# Patient Record
Sex: FEMALE | URBAN - METROPOLITAN AREA
[De-identification: names, ages, dates, MRNs, and addresses within clinical notes are randomized per-mention and may not be internally consistent; named-entity substitution may affect disease eponyms.]

---

## 2019-07-06 ENCOUNTER — HOSPITAL ENCOUNTER (OUTPATIENT)
Dept: TELEMEDICINE | Facility: HOSPITAL | Age: 64
Discharge: HOME | End: 2019-07-06
Payer: MEDICARE

## 2019-07-06 DIAGNOSIS — G45.9 TIA DUE TO EMBOLISM: ICD-10-CM

## 2019-07-06 DIAGNOSIS — I74.9 TIA DUE TO EMBOLISM: ICD-10-CM

## 2019-07-06 PROCEDURE — G0425 PR INPT TELEHEALTH CONSULT 30M: ICD-10-PCS | Mod: GT,,, | Performed by: PSYCHIATRY & NEUROLOGY

## 2019-07-06 PROCEDURE — G0425 INPT/ED TELECONSULT30: HCPCS | Mod: GT,,, | Performed by: PSYCHIATRY & NEUROLOGY

## 2019-07-07 NOTE — CONSULTS
[facility]  Vascular Neurology  Comprehensive Stroke Center  Tele-Consultation Note      Consults    Consulting Provider: COOPER KAUR  Current Providers  No providers found    Patient Location: [facility] Department  Spoke hospital nurse at bedside with patient assisting consultant.     Patient information was obtained from patient and relative(s).         Assessment/Plan:     STROKE DOCUMENTATION     Acute Stroke Times:   Acute Stroke Times   Symptom Onset Date: 07/06/19  Symptom Onset Time: 1600  Stroke Team Arrival Date: 07/06/19  Stroke Team Arrival Time: 1938  CT Interpretation Time: 1943(report. not sent via PACS)    NIH Scale:  1a. Level of Consciousness: 0-->Alert, keenly responsive  1b. LOC Questions: 0-->Answers both questions correctly  1c. LOC Commands: 0-->Performs both tasks correctly  2. Best Gaze: 0-->Normal  3. Visual: 0-->No visual loss  4. Facial Palsy: 0-->Normal symmetrical movements  5a. Motor Arm, Left: 0-->No drift, limb holds 90 (or 45) degrees for full 10 secs  5b. Motor Arm, Right: 0-->No drift, limb holds 90 (or 45) degrees for full 10 secs  6a. Motor Leg, Left: 0-->No drift, leg holds 30 degree position for full 5 secs  6b. Motor Leg, Right: 0-->No drift, leg holds 30 degree position for full 5 secs  7. Limb Ataxia: 0-->Absent  8. Sensory: 0-->Normal, no sensory loss  9. Best Language: 0-->No aphasia, normal  10. Dysarthria: 0-->Normal  11. Extinction and Inattention (formerly Neglect): 0-->No abnormality  Total (NIH Stroke Scale): 0     Modified Brayan    Mirella Coma Scale:    ABCD2 Score:    [id number] Score:   HAS -BLED Score:   ICH Score:   Hunt & Middleton Classification:       Diagnoses:   TIA due to embolism  SEE HPI    Possible transient LHP, Normal at time of exam  Ct head normal  C/e syncope +/- TIA    Antithrombotics for secondary stroke prevention: Antiplatelets: Aspirin:  81 mg daily    Statins for secondary stroke prevention and hyperlipidemia, if present:   Statins: Atorvastatin- 80 mg daily    Aggressive risk factor modification: HTN, HLD, CAD     Rehab efforts: The patient has been evaluated by a stroke team provider and the therapy needs have been fully considered based off the presenting complaints and exam findings. The following therapy evaluations are needed: PT evaluate and treat, OT evaluate and treat, SLP evaluate and treat, PM&R evaluate for appropriate placement    Diagnostics ordered/pending: Carotid ultrasound to assess vasculature, HgbA1C to assess blood glucose levels, Lipid Profile to assess cholesterol levels, MRA head to assess vasculature, MRA neck/arch to assess vasculature, MRI head without contrast to assess brain parenchyma, TTE to assess cardiac function/status     VTE prophylaxis: Heparin 5000 units SQ every 8 hours    BP parameters: TIA: SBP <220 until imaging confirmation of no infarct             There were no vitals taken for this visit.  Alteplase Eligible?: No  Alteplase Recommendation: Alteplase not recommended due to Outside of treatment window , Symptoms resolved  and minimal deficit   Possible Interventional Revascularization Candidate? No; No significant neurological deficit    Disposition Recommendation: admit to inpatient  do not transfer    Subjective:     History of Present Illness:  63 year old presents with syncope. Some left HP by report but normal at the time of my exam  CT head normal      Woke up with symptoms?: no    Recent bleeding noted: no  Does the patient take any Blood Thinners? no  Medications: No relevant medications      Past Medical History: hypertension and MI/CAD    Past Surgical History: no relevant surgical history and no major surgeries within the last 2 weeks    Family History: diabetes    Social History: former smoker    Allergies: Allergies have not been reviewed No relevant allergies    Review of Systems    Neurological: Positive for syncope, speech difficulty and weakness.   All other systems reviewed and are negative.    Objective:        CT READ: Yes  No hemmorhage. No mass effect. No early infarct signs.     Physical Exam   Constitutional: She appears well-developed.   HENT:   Trauma to orbit   Cardiovascular: Normal rate.   Pulmonary/Chest: Effort normal.   Abdominal: Soft.   Neurological: She is alert.   Skin: Skin is warm.             Recommended the emergency room physician to have a brief discussion with the patient and/or family if available regarding the risks and benefits of treatment, and to briefly document the occurrence of that discussion in his clinical encounter note.     The attending portion of this evaluation, treatment, and documentation was performed per Vinicius Wallace MD via audiovisual.    Billing code:  (non-intervention mild to moderate stroke, TIA, some mimics)    · This patient has a critical neurological condition/illness, with some potential for high morbidity and mortality.  · There is a moderate probability for acute neurological change leading to clinical and possibly life-threatening deterioration requiring highest level of physician preparedness for urgent intervention.  · Care was coordinated with other physicians involved in the patient's care.  · Radiologic studies and laboratory data were reviewed and interpreted, and plan of care was re-assessed based on the results.  · Diagnosis, treatment options and prognosis may have been discussed with the patient and/or family members or caregiver.      In your opinion, this was a: Tier 1 Van Negative    Consult End Time: 7:53 PM     Vinicius Wallace MD  [facility]roke [facility]  Vascular Neurology   [facility]

## 2019-07-07 NOTE — ASSESSMENT & PLAN NOTE
SEE HPI    Possible transient LHP, Normal at time of exam  Ct head normal  C/e syncope +/- TIA    Antithrombotics for secondary stroke prevention: Antiplatelets: Aspirin: 81 mg daily    Statins for secondary stroke prevention and hyperlipidemia, if present:   Statins: Atorvastatin- 80 mg daily    Aggressive risk factor modification: HTN, HLD, CAD     Rehab efforts: The patient has been evaluated by a stroke team provider and the therapy needs have been fully considered based off the presenting complaints and exam findings. The following therapy evaluations are needed: PT evaluate and treat, OT evaluate and treat, SLP evaluate and treat, PM&R evaluate for appropriate placement    Diagnostics ordered/pending: Carotid ultrasound to assess vasculature, HgbA1C to assess blood glucose levels, Lipid Profile to assess cholesterol levels, MRA head to assess vasculature, MRA neck/arch to assess vasculature, MRI head without contrast to assess brain parenchyma, TTE to assess cardiac function/status     VTE prophylaxis: Heparin 5000 units SQ every 8 hours    BP parameters: TIA: SBP <220 until imaging confirmation of no infarct

## 2019-07-07 NOTE — SUBJECTIVE & OBJECTIVE
Woke up with symptoms?: no    Recent bleeding noted: no  Does the patient take any Blood Thinners? no  Medications: No relevant medications      Past Medical History: hypertension and MI/CAD    Past Surgical History: no relevant surgical history and no major surgeries within the last 2 weeks    Family History: diabetes    Social History: former smoker    Allergies: Allergies have not been reviewed No relevant allergies    Review of Systems   Neurological: Positive for syncope, speech difficulty and weakness.   All other systems reviewed and are negative.    Objective:        CT READ: Yes  No hemmorhage. No mass effect. No early infarct signs.     Physical Exam   Constitutional: She appears well-developed.   HENT:   Trauma to orbit   Cardiovascular: Normal rate.   Pulmonary/Chest: Effort normal.   Abdominal: Soft.   Neurological: She is alert.   Skin: Skin is warm.

## 2019-07-07 NOTE — HPI
63 year old presents with syncope. Some left HP by report but normal at the time of my exam  CT head normal